# Patient Record
Sex: FEMALE | Race: WHITE
[De-identification: names, ages, dates, MRNs, and addresses within clinical notes are randomized per-mention and may not be internally consistent; named-entity substitution may affect disease eponyms.]

---

## 2017-02-06 ENCOUNTER — HOSPITAL ENCOUNTER (EMERGENCY)
Dept: HOSPITAL 25 - UCEAST | Age: 57
Discharge: HOME | End: 2017-02-06
Payer: MEDICARE

## 2017-02-06 VITALS — SYSTOLIC BLOOD PRESSURE: 133 MMHG | DIASTOLIC BLOOD PRESSURE: 70 MMHG

## 2017-02-06 DIAGNOSIS — Z90.49: ICD-10-CM

## 2017-02-06 DIAGNOSIS — Z88.0: ICD-10-CM

## 2017-02-06 DIAGNOSIS — N39.0: Primary | ICD-10-CM

## 2017-02-06 DIAGNOSIS — Z96.652: ICD-10-CM

## 2017-02-06 PROCEDURE — G0463 HOSPITAL OUTPT CLINIC VISIT: HCPCS

## 2017-02-06 PROCEDURE — 99212 OFFICE O/P EST SF 10 MIN: CPT

## 2017-02-06 PROCEDURE — 87086 URINE CULTURE/COLONY COUNT: CPT

## 2017-02-06 PROCEDURE — 81002 URINALYSIS NONAUTO W/O SCOPE: CPT

## 2017-02-06 NOTE — UC
Complaint Female HPI





- HPI Summary


HPI Summary: 





Patient has had 2 days of dysuria and urgency, no back pain or fever.





- History Of Current Complaint


Chief Complaint: UCGU


Stated Complaint: BURNING URINATION


Time Seen by Provider: 02/06/17 09:45


Hx Obtained From: Patient


Hx Last Menstrual Period: hyst


Pregnant?: No


Onset/Duration: Sudden Onset, Lasting Days


Timing: Constant


Severity Initially: Mild


Severity Currently: Mild


Pain Intensity: 4


Pain Scale Used: 0-10 Numeric


Character: Dull, Burning


Aggravating Factor(s): Urination


Associated Signs And Symptoms: Positive: Negative





- Risk Factors


Ectopic Pregnancy Risk Factor: Negative





- Allergies/Home Medications


Allergies/Adverse Reactions: 


 Allergies











Allergy/AdvReac Type Severity Reaction Status Date / Time


 


Penicillins Allergy Intermediate Rash Verified 09/14/16 13:32


 


dog, cats Allergy  Eyes Uncoded 09/14/16 13:32





   Itchy/Swollen/Red/Watery  


 


environmental Allergy  Eyes Uncoded 09/14/16 13:32





   Itchy/Swollen/Red/Watery  











Home Medications: 


 Home Medications





Aspirin [Aspirin Adult Low Dose]  02/06/17 [History]


Azelastine 0.15% NASAL(NF) [Astepro 0.15% NASAL (NF)]  02/06/17 [History]


Fluticasone Propionate (Nasal) [Allergy Nasal [place] 24 Ho]  02/06/17 [History]











PMH/Surg Hx/FS Hx/Imm Hx


Previously Healthy: Yes


Endocrine History Of: Reports: Thyroid Disease - hypothyroidism


Cardiovascular History Of: Reports: Cardiac Disorders - mitral valve prolapse


Neurological History Of: Reports: Migraine - OCC


Cancer History Of: 


   Denies: Breast Cancer





- Surgical History


Surgical History: Yes


Surgery Procedure, Year, and Place: CERVICAL SURGERY, FUSION X2 1990'S.  LUMBAR 

BACK SURGERY, FUSION X2  1990'S.  Complete Hysterectomy 1992.  L knee 

REPLACEMENT & REVISION 2014  [facility].  LAP CHOLECYSTECTOMY 1990.  RIGHT ROTATOR 

CUFF  2013  [facility].  R CTR WITH ULNAR NERVE DECOMPRESSION 1992.  APPENDECTOMY AS A 

CHILD





- Family History


Known Family History: 


   Negative: Cardiac Disease, Hypertension, Blood Disorder


Family History: denies cardio vascular issues in family lineage





- Social History


Alcohol Use: None


Substance Use Type: None, Prescribed


Smoking Status (MU): Never Smoked Tobacco


Have You Smoked in the Last Year: No





- Immunization History


Most Recent Influenza Vaccination: 2016


Most Recent Pneumonia Vaccination: 2015





Review of Systems


Constitutional: Negative


Skin: Negative


Eyes: Negative


ENT: Negative


Respiratory: Negative


Cardiovascular: Negative


Gastrointestinal: Negative


Genitourinary: Dysuria, Urgency


Motor: Negative


Neurovascular: Negative


Musculoskeletal: Negative


Neurological: Negative


Psychological: Negative


All Other Systems Reviewed And Are Negative: Yes





Physical Exam


Triage Information Reviewed: Yes


Appearance: Well-Appearing, Well-Nourished, Pain Distress


Vital Signs: 


 Initial Vital Signs











Temp  97.3 F   02/06/17 09:34


 


Pulse  63   02/06/17 09:34


 


Resp  16   02/06/17 09:34


 


BP  133/70   02/06/17 09:34


 


Pulse Ox  100   02/06/17 09:34











Vital Signs Reviewed: Yes


Eye Exam: Normal


Eyes: Positive: Conjunctiva Clear


ENT Exam: Normal


ENT: Positive: Normal ENT inspection, Hearing grossly normal, TMs normal


Dental Exam: Normal


Neck exam: Normal


Neck: Positive: Supple, Nontender, No Lymphadenopathy


Respiratory Exam: Normal


Respiratory: Positive: Chest non-tender, Lungs clear, Normal breath sounds


Cardiovascular Exam: Normal


Cardiovascular: Positive: RRR, No Murmur, Pulses Normal


Abdominal Exam: Other - lower abdominal "fullness", neg cva tenderness


Abdomen Description: Positive: No Organomegaly, Soft


Bowel Sounds: Positive: Present


Musculoskeletal Exam: Normal


Musculoskeletal: Positive: Strength Intact, ROM Intact, No Edema


Neurological Exam: Normal


Neurological: Positive: Alert, Muscle Tone Normal


Psychological Exam: Normal


Skin Exam: Normal





 Complaint Female Dx





- Course


Course Of Treatment: hx obtained, exam performed, UA positive for leuks, 

medications given.





- Differential Dx/Diagnosis


Differential Diagnosis/HQI/PQRI: Sexually Transmitted Disease, Ureteral Stone, 

Urinary Tract Infection


Provider Diagnoses: UTI





Discharge





- Discharge Plan


Condition: Stable


Disposition: HOME


Prescriptions: 


Sulfamethox/Trimethoprim DS* [Bactrim /160 TAB*] 1 tab PO BID #6 tab


Patient Education Materials:  Urinary Tract Infection in Women (ED)


Additional Instructions: 


Take the medication as prescribed. increase your fluid intake. follow up with 

any worsening symptoms.

## 2018-01-18 ENCOUNTER — HOSPITAL ENCOUNTER (EMERGENCY)
Dept: HOSPITAL 25 - UCCORT | Age: 58
Discharge: HOME | End: 2018-01-18
Payer: MEDICARE

## 2018-01-18 VITALS — SYSTOLIC BLOOD PRESSURE: 130 MMHG | DIASTOLIC BLOOD PRESSURE: 65 MMHG

## 2018-01-18 DIAGNOSIS — J01.90: Primary | ICD-10-CM

## 2018-01-18 DIAGNOSIS — B96.89: ICD-10-CM

## 2018-01-18 PROCEDURE — 87502 INFLUENZA DNA AMP PROBE: CPT

## 2018-01-18 PROCEDURE — 87651 STREP A DNA AMP PROBE: CPT

## 2018-01-18 PROCEDURE — 99212 OFFICE O/P EST SF 10 MIN: CPT

## 2018-01-18 PROCEDURE — G0463 HOSPITAL OUTPT CLINIC VISIT: HCPCS

## 2018-01-18 NOTE — UC
Ear Complaint HPI





- HPI Summary


HPI Summary: 





56 y/o female presents to the urgent care c/o sore throat, B/L ear pain for the 

past week. Pt reports symptoms started with common cold.  She has been taking 

Alkazeltser cold OTC to alleviate symptoms. Her LF ear pain >RT ear, sore 

throat is 5/10 with swallowing, Mild nasal yellowish discharge. Pt denies fever

, SOB, HA, chest pain, abdominal pain, N/V/D








- History of Current Complaint


Chief Complaint: UCEar


Stated Complaint: SORE THROAT LEFT EARACHE CONGESTION


Time Seen by Provider: 01/18/18 11:43


Hx Obtained From: Patient


Hx Last Menstrual Period: n/a


Onset/Duration: Gradual Onset, Lasting Weeks - 1 week, Still Present, Worse 

Since - yesterday


Severity Initially: Mild


Severity Currently: Moderate


Pain Intensity: 5


Pain Scale Used: 0-10 Numeric


Aggravating Factors: Nothing


Alleviating Factors: OTC Meds


Associated Signs/Symptoms: Positive: URI Symptoms





- Allergies/Home Medications


Allergies/Adverse Reactions: 


 Allergies











Allergy/AdvReac Type Severity Reaction Status Date / Time


 


Penicillins Allergy Intermediate Rash Verified 01/18/18 11:46


 


dog, cats Allergy  Eyes Uncoded 01/18/18 11:46





   Itchy/Swollen/Red/Watery  


 


environmental Allergy  Eyes Uncoded 01/18/18 11:46





   Itchy/Swollen/Red/Watery  











Home Medications: 


 Home Medications





Phenylephrine-Chlorpheniramine [Tiffanie-Drummond Plus Cold & 5-2- mg] 2 tab 

PO BID PRN 01/18/18 [History Confirmed 01/18/18]


Pseudoephedrine HCL ER TAB* [Sudafed 12 Hour*] 120 mg PO BID 01/18/18 [History 

Confirmed 01/18/18]











PMH/Surg Hx/FS Hx/Imm Hx


Previously Healthy: Yes


Endocrine History: Hypothyroidism


Other Endocrine History: Fibromyalgia


Cardiovascular History: Myocardial Infarction





- Surgical History


Surgical History: Yes


Surgery Procedure, Year, and Place: CERVICAL SURGERY, FUSION X2 1990'S.  LUMBAR 

BACK SURGERY, FUSION X2  1990'S.  Complete Hysterectomy 1992.  L knee 

REPLACEMENT & REVISION 2014 [facility].  LAP CHOLECYSTECTOMY 1990.  RIGHT ROTATOR 

CUFF  2013  [facility].  R CTR WITH ULNAR NERVE DECOMPRESSION 1992.  APPENDECTOMY AS A 

CHILD





- Family History


Known Family History: Positive: Cardiac Disease


   Negative: Hypertension, Blood Disorder


Family History: Esophageal cancer





- Social History


Occupation: Employed Full-time


Lives: With Family


Alcohol Use: None


Substance Use Type: None, Prescribed


Smoking Status (MU): Never Smoked Tobacco


Have You Smoked in the Last Year: No





- Immunization History


Most Recent Influenza Vaccination: 2016


Most Recent Pneumonia Vaccination: 2015





Review of Systems


Constitutional: Chills


Skin: Negative


Eyes: Negative


ENT: Sore Throat, Ear Ache - B/L, Nasal Discharge, Sinus Congestion


Respiratory: Negative


Cardiovascular: Negative


Gastrointestinal: Negative


Genitourinary: Negative


Motor: Negative


Neurovascular: Negative


Musculoskeletal: Negative


Neurological: Negative


Psychological: Negative


Is Patient Immunocompromised?: No


All Other Systems Reviewed And Are Negative: Yes





Physical Exam


Triage Information Reviewed: Yes


Vital Signs: 


 Initial Vital Signs











Temp  98.2 F   01/18/18 11:41


 


Pulse  64   01/18/18 11:41


 


Resp  20   01/18/18 11:41


 


BP  130/65   01/18/18 11:41














- Additional Comments


Vital signs: reviewed


General: well developed well nourished female w/o any apparent distress.


Skin: Pink, warm and dry, no evidence of atopic dermatitis, psoriasis, 

seborrhea.


HEENT:


-Head: atraumatic, non tender; no scalp dermatitis.


-Eyes: sclera and conjunctiva clear, PERRLA, EOMI


-Ears: no pre- or postauricular lymphadenopathy or erythema; RT external ear 

canal with erythema and yellowish purulent discharge, pinna tenderness on 

palpation, Rt TM WNL, LF external ear canal clear and LF TM WNL. TMs normal w/

out bulging or retraction. Good light reflex. No fluid level, vesicles, or 

bullae. No perforation.


-Nose/Face: erythematous and edematous nasal mucosa with clear rhinorrhea, no 

frontal or maxillary sinus tender to palpation.


-Mouth/Throat: Mucous membrane moist, posterior pharynx clear, no erythema or 

exudates.


Neck: supple, FROM, nontender, no lymphadenopathy, no meningismus.


Chest: Clear to auscultation, normal breath sounds


Abd: soft, Bowel sounds active, Nontender.


Back: no spinal or CVAT


Neuro: A&O x4, GCS 15, no focal neuro deficits, normal behavior for age.














Ear Complaint Course/Dx





- Course


Course Of Treatment: 56 y/o female presents to the urgent care c/o sore throat, 

B/L ear pain for the past week. Pt reports symptoms started with common cold.  

She has been taking Alkazetler cold OTC to alleviate symptoms. Her LF ear pain >

RT ear, sore throat is 5/10 with swallowing, Mild nasal yellowish discharge. Pt 

denies fever, SOB, HA, chest pain, abdominal pain, N/V/D. Hx obtained. Pt with 

sinusitis on examination.Pt is PCP allergic. Pt with 1 week of symptoms getting 

worse. Pt Rx Doxycycline PO and flonase nasal spray.  Discharge instructions 

explained to Pt. Advised to Return to the clinic or  PCP if symptoms do not 

improve.Pt understood and agreed with plan of care.





- Differential Dx/Diagnosis


Differential Diagnosis/HQI/PQRI: Bronchitis, Otitis Externa, Otitis Media, 

Perforated TM, Pharyngitis, URI, Other - Influenza


Provider Diagnoses: 1- Acute bacterial sinusitis





Discharge





- Discharge Plan


Condition: Stable


Disposition: HOME


Prescriptions: 


DOXYcycline CAP(*) [DOXYcycline 100MG CAP(*)] 100 mg PO BID #20 cap


Fluticasone NASAL SPRAY 50MCG* [Flonase NASAL SPRAY 50MCG*] 2 spray BOTH NARES 

DAILY #1 btl


Patient Education Materials:  Sinusitis (ED)


Referrals: 


Archana Hernandez MD [Primary Care Provider] - 3 Days


Additional Instructions: 


1- Please increase fluid intake and rest. take full course of antibiotic to 

avoid resistance


2-Use Flonase as directed to help drain fluid. Also buy saline drops to clear 

sinuses


3-Take Sudafed or Claritin PO to alleviates sinus congestion


4-Return to the clinic or PCP if symptoms do not improve for further management 

and treatment

## 2018-07-16 ENCOUNTER — HOSPITAL ENCOUNTER (EMERGENCY)
Dept: HOSPITAL 25 - UCEAST | Age: 58
Discharge: HOME | End: 2018-07-16
Payer: COMMERCIAL

## 2018-07-16 VITALS — DIASTOLIC BLOOD PRESSURE: 69 MMHG | SYSTOLIC BLOOD PRESSURE: 127 MMHG

## 2018-07-16 DIAGNOSIS — Z88.0: ICD-10-CM

## 2018-07-16 DIAGNOSIS — N39.0: Primary | ICD-10-CM

## 2018-07-16 DIAGNOSIS — Z91.09: ICD-10-CM

## 2018-07-16 PROCEDURE — G0463 HOSPITAL OUTPT CLINIC VISIT: HCPCS

## 2018-07-16 PROCEDURE — 87086 URINE CULTURE/COLONY COUNT: CPT

## 2018-07-16 PROCEDURE — 99212 OFFICE O/P EST SF 10 MIN: CPT

## 2018-07-16 PROCEDURE — 81003 URINALYSIS AUTO W/O SCOPE: CPT

## 2018-07-16 NOTE — UC
Complaint Female HPI





- HPI Summary


HPI Summary: 


pain and burning with urination began today---no fevers chills nausea vomiting 

or back ache








- History Of Current Complaint


Chief Complaint: UCGU


Stated Complaint: UTI


Time Seen by Provider: 07/16/18 18:23


Hx Obtained From: Patient


Hx Last Menstrual Period: 


Pregnant?: No


Onset/Duration: Sudden Onset, Lasting Days - 1, Still Present


Timing: Constant


Pain Intensity: 6


Pain Scale Used: 0-10 Numeric


Character: Burning


Aggravating Factor(s): Urination


Associated Signs And Symptoms: Negative: Fever, Back Pain, Nausea, Vomiting(# 

Of Episodes =)





- Allergies/Home Medications


Allergies/Adverse Reactions: 


 Allergies











Allergy/AdvReac Type Severity Reaction Status Date / Time


 


Penicillins Allergy  Rash Verified 07/16/18 18:41


 


dog, cats Allergy  Eyes Uncoded 07/16/18 18:41





   Itchy/Swollen/Red/Watery  


 


environmental Allergy  Eyes Uncoded 07/16/18 18:41





   Itchy/Swollen/Red/Watery  














PMH/Surg Hx/FS Hx/Imm Hx


Previously Healthy: No


Endocrine History: Hypothyroidism, Dyslipidemia





- Surgical History


Surgical History: Yes


Surgery Procedure, Year, and Place: CERVICAL SURGERY, FUSION X2 1990'S.  LUMBAR 

BACK SURGERY, FUSION X2  1990'S.  Complete Hysterectomy 1992.  L knee 

REPLACEMENT & REVISION 2014 [facility].  Right wrist surgery 2016.  LAP 

CHOLECYSTECTOMY 1990.  RIGHT ROTATOR CUFF  2013  [facility].  R CTR WITH ULNAR NERVE 

DECOMPRESSION 1992.  APPENDECTOMY AS A CHILD





- Family History


Known Family History: Positive: Cardiac Disease


   Negative: Hypertension, Blood Disorder


Family History: Esophageal cancer





- Social History


Occupation: Employed Part-time


Lives: With Family


Alcohol Use: None


Substance Use Type: None


Smoking Status (MU): Never Smoked Tobacco


Have You Smoked in the Last Year: No





- Immunization History


Most Recent Influenza Vaccination: 2016


Most Recent Pneumonia Vaccination: 2015





Review of Systems


Constitutional: Negative


Skin: Negative


Eyes: Negative


ENT: Negative


Respiratory: Negative


Cardiovascular: Negative


Gastrointestinal: Negative


Genitourinary: Dysuria, Frequency, Urgency


Motor: Negative


Neurovascular: Negative


Musculoskeletal: Negative


Neurological: Negative


Psychological: Negative


Is Patient Immunocompromised?: No


All Other Systems Reviewed And Are Negative: Yes





Physical Exam


Triage Information Reviewed: Yes


Appearance: Well-Appearing, No Pain Distress, Well-Nourished


Vital Signs: 


 Initial Vital Signs











Temp  98.4 F   07/16/18 18:35


 


Pulse  71   07/16/18 18:35


 


Resp  16   07/16/18 18:35


 


BP  127/69   07/16/18 18:35


 


Pulse Ox  100   07/16/18 18:35











Vital Signs Reviewed: Yes


Eye Exam: Normal


Eyes: Positive: Conjunctiva Clear


ENT Exam: Normal


ENT: Positive: Normal ENT inspection, Hearing grossly normal.  Negative: Nasal 

congestion, Trismus, Muffled voice, Hoarse voice


Dental Exam: Normal


Neck exam: Normal


Neck: Positive: Supple, Nontender


Respiratory Exam: Normal


Respiratory: Positive: Chest non-tender, No respiratory distress, No accessory 

muscle use


Cardiovascular Exam: Normal


Cardiovascular: Positive: RRR, Pulses Normal, Brisk Capillary Refill


Abdominal Exam: Normal


Abdomen Description: Positive: Nontender, No Organomegaly, Soft.  Negative: CVA 

Tenderness (R), CVA Tenderness (L), Distended, Guarding


Bowel Sounds: Positive: Present


Musculoskeletal Exam: Normal


Musculoskeletal: Positive: Strength Intact, ROM Intact, No Edema


Neurological Exam: Normal


Neurological: Positive: Alert, Muscle Tone Normal


Psychological Exam: Normal


Skin Exam: Normal





 Complaint Female Dx





- Course


Course Of Treatment: pyridium, bactrim, increase fluids, culture urine follow 

with pcp prn





- Differential Dx/Diagnosis


Provider Diagnoses: UTI





Discharge





- Sign-Out/Discharge


Documenting (check all that apply): Patient Departure





- Discharge Plan


Condition: Stable


Disposition: HOME


Prescriptions: 


Fluconazole [Diflucan 150 MG (NF)] 150 mg PO ONCE #1 tab


Phenazopyridine TAB* [Pyridium 100 mg TAB*] 100 mg PO TID PRN #6 tab


 PRN Reason: urinary pain and burning


Sulfamethox/Trimethoprim DS* [Bactrim /160 TAB*] 1 tab PO BID #10 tab


Patient Education Materials:  Phenazopyridine (By mouth), Urinary Tract 

Infection in Women (ED)


Referrals: 


Archana Hernandez MD [Primary Care Provider] - If Needed





- Billing Disposition and Condition


Condition: STABLE


Disposition: Home

## 2018-08-01 ENCOUNTER — HOSPITAL ENCOUNTER (EMERGENCY)
Dept: HOSPITAL 25 - UCEAST | Age: 58
Discharge: HOME | End: 2018-08-01
Payer: MEDICARE

## 2018-08-01 VITALS — SYSTOLIC BLOOD PRESSURE: 125 MMHG | DIASTOLIC BLOOD PRESSURE: 59 MMHG

## 2018-08-01 DIAGNOSIS — Z88.0: ICD-10-CM

## 2018-08-01 DIAGNOSIS — R30.0: Primary | ICD-10-CM

## 2018-08-01 DIAGNOSIS — Z96.652: ICD-10-CM

## 2018-08-01 DIAGNOSIS — Z90.49: ICD-10-CM

## 2018-08-01 DIAGNOSIS — Z90.710: ICD-10-CM

## 2018-08-01 PROCEDURE — 87798 DETECT AGENT NOS DNA AMP: CPT

## 2018-08-01 PROCEDURE — 99212 OFFICE O/P EST SF 10 MIN: CPT

## 2018-08-01 PROCEDURE — 81003 URINALYSIS AUTO W/O SCOPE: CPT

## 2018-08-01 PROCEDURE — 87086 URINE CULTURE/COLONY COUNT: CPT

## 2018-08-01 PROCEDURE — G0463 HOSPITAL OUTPT CLINIC VISIT: HCPCS

## 2018-08-01 NOTE — UC
Complaint Female HPI





- HPI Summary


HPI Summary: 





the patient is a 57-year-old female with a less than 24-hour history of dysuria 

and urgency.  She has chronic back pain.  She denies any abdominal pain.  She 

has had no fever or chills.  No nausea vomiting or diarrhea.  She denies any 

vaginal discharge or itching.  She has had no history of pyelonephritis or 

kidney stones.





- History Of Current Complaint


Chief Complaint: UCGU


Stated Complaint: URINARY ISSUE


Time Seen by Provider: 08/01/18 11:43


Hx Obtained From: Patient


Hx Last Menstrual Period: 


Onset/Duration: Gradual Onset, Lasting Hours


Timing: Intermittent, Lasting Minutes


Severity Initially: Moderate


Severity Currently: None


Pain Intensity: 6 - no pain at present/6 when urinating


Pain Scale Used: 0-10 Numeric


Character: Burning


Aggravating Factor(s): Urination


Related Hx: Similar Episode/Dx as: - uti





- Allergies/Home Medications


Allergies/Adverse Reactions: 


 Allergies











Allergy/AdvReac Type Severity Reaction Status Date / Time


 


Penicillins Allergy  Rash Verified 07/16/18 18:41


 


dog, cats Allergy  Eyes Uncoded 07/16/18 18:41





   Itchy/Swollen/Red/Watery  


 


environmental Allergy  Eyes Uncoded 07/16/18 18:41





   Itchy/Swollen/Red/Watery  














PMH/Surg Hx/FS Hx/Imm Hx


Previously Healthy: Yes





- Surgical History


Surgical History: Yes


Surgery Procedure, Year, and Place: CERVICAL SURGERY, FUSION X2 1990'S.  LUMBAR 

BACK SURGERY, FUSION X2  1990'S.  Complete Hysterectomy 1992.  L knee 

REPLACEMENT & REVISION 2014  [facility].  Right wrist surgery 2016.  LAP 

CHOLECYSTECTOMY 1990.  RIGHT ROTATOR CUFF  2013  [facility].  R CTR WITH ULNAR NERVE 

DECOMPRESSION 1992.  APPENDECTOMY AS A CHILD





- Family History


Known Family History: Positive: Cardiac Disease


   Negative: Hypertension, Blood Disorder


Family History: Esophageal cancer





- Social History


Alcohol Use: None


Substance Use Type: None


Smoking Status (MU): Never Smoked Tobacco


Have You Smoked in the Last Year: No





- Immunization History


Most Recent Influenza Vaccination: 2016


Most Recent Pneumonia Vaccination: 2015





Review of Systems


Constitutional: Negative


Skin: Negative


Eyes: Negative


ENT: Negative


Respiratory: Negative


Cardiovascular: Negative


Gastrointestinal: Negative


Genitourinary: Dysuria, Frequency, Urgency


Motor: Negative


Neurovascular: Negative


Musculoskeletal: Negative


Neurological: Negative


Psychological: Negative


Is Patient Immunocompromised?: No


All Other Systems Reviewed And Are Negative: Yes





Physical Exam


Triage Information Reviewed: Yes


Appearance: Well-Appearing, No Pain Distress, Well-Nourished


Vital Signs: 


 Initial Vital Signs











Temp  97.7 F   08/01/18 10:59


 


Pulse  74   08/01/18 10:59


 


Resp  17   08/01/18 10:59


 


BP  125/59   08/01/18 10:59


 


Pulse Ox  100   08/01/18 10:59











Vital Signs Reviewed: Yes


Eyes: Positive: Conjunctiva Clear


ENT: Positive: Hearing grossly normal.  Negative: Nasal congestion, Nasal 

drainage, Trismus, Muffled voice, Hoarse voice


Neck: Positive: Supple, Nontender


Respiratory: Positive: Lungs clear, Normal breath sounds, No respiratory 

distress


Cardiovascular: Positive: RRR, No Murmur


Abdomen Description: Positive: Nontender, No Organomegaly, Soft.  Negative: CVA 

Tenderness (R), CVA Tenderness (L)


Musculoskeletal: Positive: ROM Intact, No Edema


Neurological: Positive: Alert


Psychological Exam: Normal





Diagnostics





- Laboratory


Diagnostic Studies Completed/Ordered: UA +1 leuks





 Complaint Female Dx





- Differential Dx/Diagnosis


Provider Diagnoses: dysuria





Discharge





- Sign-Out/Discharge


Documenting (check all that apply): Patient Departure





- Discharge Plan


Condition: Stable


Disposition: HOME


Prescriptions: 


Cephalexin CAP* [Keflex CAP*] 500 mg PO BID #10 cap


Fluconazole 150 MG (NF) [Diflucan 150 mg (NF)] 150 mg PO ONCE #1 tab


Phenazopyridine TAB* [Pyridium TAB*] 100 mg PO TID #6 tab


Patient Education Materials:  Dysuria (ED)


Referrals: 


Archana Hernandez MD [Primary Care Provider] - 5 Days (if not better)





- Billing Disposition and Condition


Condition: STABLE


Disposition: Home

## 2019-04-20 ENCOUNTER — HOSPITAL ENCOUNTER (EMERGENCY)
Dept: HOSPITAL 25 - UCEAST | Age: 59
Discharge: HOME | End: 2019-04-20
Payer: MEDICARE

## 2019-04-20 VITALS — SYSTOLIC BLOOD PRESSURE: 131 MMHG | DIASTOLIC BLOOD PRESSURE: 58 MMHG

## 2019-04-20 DIAGNOSIS — M26.621: Primary | ICD-10-CM

## 2019-04-20 DIAGNOSIS — Z88.0: ICD-10-CM

## 2019-04-20 DIAGNOSIS — Z91.048: ICD-10-CM

## 2019-04-20 DIAGNOSIS — Z96.652: ICD-10-CM

## 2019-04-20 DIAGNOSIS — H93.11: ICD-10-CM

## 2019-04-20 PROCEDURE — 99212 OFFICE O/P EST SF 10 MIN: CPT

## 2019-04-20 PROCEDURE — 70486 CT MAXILLOFACIAL W/O DYE: CPT

## 2019-04-20 PROCEDURE — G0463 HOSPITAL OUTPT CLINIC VISIT: HCPCS

## 2019-04-20 NOTE — UC
General HPI





- HPI Summary


HPI Summary: 





59 yo female with last several days of R jaw pain.  Denies known inciting 

event.  Hurts to open mouth fully.  Seen yesterday by dentist, dental xrays ok.

  Approx 2 weeks ago had cervical neck injections d/t djd (s/p cervical spine 

surgery several years ago).  No fever / chills.  Some ringing in Right ear.  

Does not think her hearing has decreased substantially but difficult to tell d/

t pain.  Some pain in ant lat neck.  no mouth sores.  Strong hx of spinal issues

, including djd, but never hx R jaw pain.  Does not gring teeth at night.  Has 

used heat, which may have helped a little but not sure.  Has taken hydrocodone, 

did not help.  Nsaid x 2, but not sure if helped.  No GI issues.  No cardiopulm 

issues.  No visual issues.  No h/a except as above.  No rash.  No pain in 

sinuses. 





- History of Current Complaint


Chief Complaint: UCUpperExtremity


Stated Complaint: JAW PAIN


Time Seen by Provider: 04/20/19 08:32


Hx Obtained From: Patient


Hx Last Menstrual Period: 


Pain Intensity: 6





- Allergy/Home Medications


Allergies/Adverse Reactions: 


 Allergies











Allergy/AdvReac Type Severity Reaction Status Date / Time


 


Penicillins Allergy  Rash Verified 04/20/19 08:21


 


dog, cats Allergy  Eyes Uncoded 04/20/19 08:21





   Itchy/Swollen/Red/Watery  


 


environmental Allergy  Eyes Uncoded 04/20/19 08:21





   Itchy/Swollen/Red/Watery  














PMH/Surg Hx/FS Hx/Imm Hx





- Additional Past Medical History


Additional PMH: 





see hpi


Previously Healthy: Yes





- Surgical History


Surgical History: Yes


Surgery Procedure, Year, and Place: CERVICAL SURGERY, FUSION X2 1990'S.  LUMBAR 

BACK SURGERY, FUSION X2  1990'S.  Complete Hysterectomy 1992.  L knee 

REPLACEMENT & REVISION 2014  [facility].  Right wrist surgery 2016.  LAP 

CHOLECYSTECTOMY 1990.  RIGHT ROTATOR CUFF  2013  [facility].  R CTR WITH ULNAR NERVE 

DECOMPRESSION 1992.  APPENDECTOMY AS A CHILD





- Family History


Known Family History: Positive: Cardiac Disease


   Negative: Hypertension, Blood Disorder


Family History: Esophageal cancer





- Social History


Alcohol Use: None


Substance Use Type: None


Smoking Status (MU): Never Smoked Tobacco


Have You Smoked in the Last Year: No





- Immunization History


Most Recent Influenza Vaccination: 2016


Most Recent Pneumonia Vaccination: 2015





Review of Systems


All Other Systems Reviewed And Are Negative: Yes


Constitutional: Positive: Other - see hpi


Skin: Positive: Other - see hpi


Eyes: Positive: Other - see hpi


ENT: Positive: Other - see hpi


Respiratory: Positive: Other - see hpi


Cardiovascular: Positive: Other - see hpi


Gastrointestinal: Positive: Other - see hpi


Genitourinary: Positive: Other - see hpi


Motor: Positive: Other - see hpi


Neurovascular: Positive: Other - see hpi


Musculoskeletal: Positive: Other: - see hpi


Neurological: Positive: Other - see hpi


Psychological: Positive: Negative, Other


Is Patient Immunocompromised?: No





Physical Exam


Triage Information Reviewed: Yes


Appearance: Well-Appearing, Well-Nourished


Vital Signs: 


 Initial Vital Signs











Temp  97.3 F   04/20/19 08:16


 


Pulse  69   04/20/19 08:16


 


Resp  18   04/20/19 08:16


 


BP  123/68   04/20/19 08:16


 


Pulse Ox  99   04/20/19 08:16











Vital Signs Reviewed: Yes


Eye Exam: Normal


ENT Exam: Other - No josh sinus tend


ENT: Positive: Pharynx normal, TM dull, Other - post pharynx nad.   Tender R tmj

, but nontender teeth / gums.  No sores / exudates.  trachea midline.   EAC ok.

   No cerumen.  Tender R tmj, mild trismus.


Dental Exam: Other - see below


Neck exam: Other - djd, cervical sx well healed scar


Respiratory Exam: Normal


Respiratory: Positive: Chest non-tender, Lungs clear, Normal breath sounds, No 

respiratory distress, No accessory muscle use


Cardiovascular Exam: Normal


Cardiovascular: Positive: RRR, No Murmur, Pulses Normal, Brisk Capillary Refill


Abdominal Exam: Normal


Abdomen Description: Positive: Nontender


Musculoskeletal Exam: Normal - gait steady


Neurological Exam: Normal - grossly nonfocal


Psychological Exam: Normal - conversing easily and appropriately


Skin Exam: Normal - no visible or reported rash





Course/Dx





- Course


Course Of Treatment: 





CT facial bones n/c reviewed with pt. 





D/w pt coa / tx plan.  Need to go to ED for worse or new problems.  





S/sx c/w tmj, however, tinnitus is concerning.  D/w pt.  Will f/u pcp early 

this week.  May need audiogram.  


No recent abx. 





Will start prednisone. 


Also muscle relax. 





Questions as posed answered to the best of my ability. 





- Diagnoses


Provider Diagnosis: 


 Tinnitus, Temporomandibular joint (TMJ) pain








Discharge





- Sign-Out/Discharge


Documenting (check all that apply): Patient Departure


All imaging exams completed and their final reports reviewed: Yes





- Discharge Plan


Condition: Good


Disposition: HOME


Prescriptions: 


Cyclobenzaprine TAB* [Flexeril 10 MG TAB*] 10 mg PO TID PRN #30 tab


 PRN Reason: Spasms


predniSONE TAB* [Deltasone 10 MG TAB*] 10 mg PO DAILY #20 tab


Patient Education Materials:  Temporomandibular Disorder (ED), Tinnitus (ED)


Referrals: 


Archana Hernandez MD [Primary Care Provider] - 


Additional Instructions: 


Very important that you follow up with your primary care physician, try to 

schedule appointment for early this week (Monday if possible). 





I recommend that you have an audiogram, to assess your hearing status.  This 

would need to be ordered by your primary care physician.  





Please go to the Emergency Department for any worse or new problems. 








- Billing Disposition and Condition


Condition: GOOD


Disposition: Home

## 2019-04-20 NOTE — UC
UC General HPI





- History of Current Complaint


Chief Complaint: UCUpperExtremity


Stated Complaint: JAW PAIN


Time Seen by Provider: 04/20/19 08:32


Hx Last Menstrual Period: 


Pain Intensity: 6





- Allergy/Home Medications


Allergies/Adverse Reactions: 


 Allergies











Allergy/AdvReac Type Severity Reaction Status Date / Time


 


Penicillins Allergy  Rash Verified 04/20/19 08:21


 


dog, cats Allergy  Eyes Uncoded 04/20/19 08:21





   Itchy/Swollen/Red/Watery  


 


environmental Allergy  Eyes Uncoded 04/20/19 08:21





   Itchy/Swollen/Red/Watery  














PMH/Surg Hx/FS Hx/Imm Hx





- Surgical History


Surgical History: Yes


Surgery Procedure, Year, and Place: CERVICAL SURGERY, FUSION X2 1990'S.  LUMBAR 

BACK SURGERY, FUSION X2  1990'S.  Complete Hysterectomy 1992.  L knee 

REPLACEMENT & REVISION 2014 [facility].  Right wrist surgery 2016.  LAP 

CHOLECYSTECTOMY 1990.  RIGHT ROTATOR CUFF  2013  [facility].  R CTR WITH ULNAR NERVE 

DECOMPRESSION 1992.  APPENDECTOMY AS A CHILD





- Family History


Known Family History: Positive: Cardiac Disease


   Negative: Hypertension, Blood Disorder


Family History: Esophageal cancer





- Social History


Alcohol Use: None


Substance Use Type: None


Smoking Status (MU): Never Smoked Tobacco


Have You Smoked in the Last Year: No





- Immunization History


Most Recent Influenza Vaccination: 2016


Most Recent Pneumonia Vaccination: 2015





Physical Exam


Vital Signs: 


 Initial Vital Signs











Temp  97.3 F   04/20/19 08:16


 


Pulse  69   04/20/19 08:16


 


Resp  18   04/20/19 08:16


 


BP  123/68   04/20/19 08:16


 


Pulse Ox  99   04/20/19 08:16














Discharge





- Discharge Plan


Referrals: 


Archana Hernandez MD [Primary Care Provider] -

## 2019-04-29 ENCOUNTER — HOSPITAL ENCOUNTER (EMERGENCY)
Dept: HOSPITAL 25 - UCEAST | Age: 59
Discharge: HOME | End: 2019-04-29
Payer: MEDICARE

## 2019-04-29 VITALS — DIASTOLIC BLOOD PRESSURE: 60 MMHG | SYSTOLIC BLOOD PRESSURE: 124 MMHG

## 2019-04-29 DIAGNOSIS — Z96.652: ICD-10-CM

## 2019-04-29 DIAGNOSIS — J32.9: Primary | ICD-10-CM

## 2019-04-29 DIAGNOSIS — Z91.048: ICD-10-CM

## 2019-04-29 DIAGNOSIS — Z88.0: ICD-10-CM

## 2019-04-29 PROCEDURE — G0463 HOSPITAL OUTPT CLINIC VISIT: HCPCS

## 2019-04-29 PROCEDURE — 99212 OFFICE O/P EST SF 10 MIN: CPT

## 2019-04-29 NOTE — UC
Throat Pain/Nasal Arthur HPI





- HPI Summary


HPI Summary: 





58 year old female recently seen and dxd with TMJ/ muscle spasms presents with 

sinus pressure, congestion, post-nasal drip x 5 days.   + fever, + chills, 

minimal cough, non-=productive.  + fatigue.  TMJ symptoms resolved.   





- History of Current Complaint


Stated Complaint: SINUS ISSUES


Time Seen by Provider: 04/29/19 08:29


Hx Obtained From: Patient


Hx Last Menstrual Period: 


Pregnant?: No


Onset/Duration: Sudden Onset, Lasting Days, Worse Since


Severity: Moderate


Cough: Nonproductive


Associated Signs & Symptoms: Positive: Sinus Discomfort, Nasal Discharge





- Allergies/Home Medications


Allergies/Adverse Reactions: 


 Allergies











Allergy/AdvReac Type Severity Reaction Status Date / Time


 


Penicillins Allergy  Rash Verified 04/29/19 09:01


 


dog, cats Allergy  Eyes Uncoded 04/29/19 09:01





   Itchy/Swollen/Red/Watery  


 


environmental Allergy  Eyes Uncoded 04/29/19 09:01





   Itchy/Swollen/Red/Watery  














PMH/Surg Hx/FS Hx/Imm Hx


Previously Healthy: Yes





- Surgical History


Surgical History: Yes


Surgery Procedure, Year, and Place: CERVICAL SURGERY, FUSION X2 1990'S.  LUMBAR 

BACK SURGERY, FUSION X2  1990'S.  Complete Hysterectomy 1992.  L knee 

REPLACEMENT & REVISION 2014  [facility].  Right wrist surgery 2016.  LAP 

CHOLECYSTECTOMY 1990.  RIGHT ROTATOR CUFF  2013  [facility].  R CTR WITH ULNAR NERVE 

DECOMPRESSION 1992.  APPENDECTOMY AS A CHILD





- Family History


Known Family History: Positive: Cardiac Disease


   Negative: Hypertension, Blood Disorder


Family History: Esophageal cancer





- Social History


Alcohol Use: None


Substance Use Type: None


Smoking Status (MU): Never Smoked Tobacco


Have You Smoked in the Last Year: No





- Immunization History


Most Recent Influenza Vaccination: 2016


Most Recent Pneumonia Vaccination: 2015





Review of Systems


All Other Systems Reviewed And Are Negative: Yes


Constitutional: Positive: Fever, Chills, Fatigue


ENT: Positive: Sore Throat, Nasal Discharge, Sinus Congestion, Sinus Pain/

Tenderness


Respiratory: Positive: Cough


Neurological: Positive: Headache


Is Patient Immunocompromised?: No





Physical Exam


Triage Information Reviewed: No


Appearance: No Pain Distress, Well-Nourished, Ill-Appearing - mild


Vital Signs Reviewed: Yes


Eyes: Positive: Conjunctiva Clear


ENT: Positive: Pharyngeal erythema - mild, Nasal congestion, Nasal drainage, 

TMs normal, Sinus tenderness, Uvula midline.  Negative: Tonsillar swelling, 

Tonsillar exudate


Neck: Positive: Supple, Nontender, No Lymphadenopathy.  Negative: Nuchal 

Rigidity, Enlarged Nodes @


Respiratory: Positive: Chest non-tender, Lungs clear, Normal breath sounds, No 

respiratory distress, No accessory muscle use.  Negative: Crackles, Rhonchi, 

Stridor, Expiration, Inspiration


Cardiovascular: Positive: RRR


Psychological Exam: Normal


Skin Exam: Normal





Throat Pain/Nasal Course/Dx





- Course


Course Of Treatment: 





sinusitis, abx given, follow up with pcp if no improvement within 2-3 days 





- Differential Dx/Diagnosis


Differential Diagnosis/HQI/PQRI: Influenza, Pharyngitis, Sinusitis


Provider Diagnosis: 


 Sinusitis








Discharge





- Sign-Out/Discharge


Documenting (check all that apply): Patient Departure


All imaging exams completed and their final reports reviewed: No Studies





- Discharge Plan


Condition: Good


Disposition: HOME


Prescriptions: 


DOXYcycline CAP(*) [DOXYcycline 100MG CAP(*)] 100 mg PO BID #14 cap


Fluconazole 150 MG TAB* [Diflucan 150 MG TAB*] 150 mg PO ONCE #1 tablet


Patient Education Materials:  Sinusitis (ED)


Referrals: 


Archana Hernandez MD [Primary Care Provider] - 


Additional Instructions: 


- Over the counter medications for symptoms 


- Tylenol/ motrin for fever, pain 


- Go to ER with worsening pain, fever > 102., neck pain 


- Antibiotics as directed 


- Increase fluid intake 








- Billing Disposition and Condition


Condition: GOOD


Disposition: Home

## 2020-01-03 ENCOUNTER — HOSPITAL ENCOUNTER (EMERGENCY)
Dept: HOSPITAL 25 - UCCORT | Age: 60
Discharge: HOME | End: 2020-01-03
Payer: MEDICARE

## 2020-01-03 VITALS — SYSTOLIC BLOOD PRESSURE: 118 MMHG | DIASTOLIC BLOOD PRESSURE: 60 MMHG

## 2020-01-03 DIAGNOSIS — J11.1: Primary | ICD-10-CM

## 2020-01-03 DIAGNOSIS — Z88.0: ICD-10-CM

## 2020-01-03 DIAGNOSIS — Z91.09: ICD-10-CM

## 2020-01-03 DIAGNOSIS — J32.9: ICD-10-CM

## 2020-01-03 LAB — FLUBV RNA SPEC QL NAA+PROBE: POSITIVE

## 2020-01-03 PROCEDURE — 99212 OFFICE O/P EST SF 10 MIN: CPT

## 2020-01-03 PROCEDURE — 87651 STREP A DNA AMP PROBE: CPT

## 2020-01-03 PROCEDURE — G0463 HOSPITAL OUTPT CLINIC VISIT: HCPCS

## 2020-01-03 NOTE — UC
Throat Pain/Nasal Arthur HPI





- HPI Summary


HPI Summary: 


Patient is a 60yo female presenting with sore throat and nasal congestion x10 

days. States has moved to chest and now has mild productive cough mostly at 

night. Denies sob, wheezing, difficulty breathing. Notes sinus pressure and 

headaches. Denies ear pain. Notes subjective fevers body aches and chills 

intermittently since yesterday. Denies n/v. States has tried everything otc 

without relief.








- History of Current Complaint


Chief Complaint: UCGeneralIllness


Stated Complaint: SORE THROAT COUGH TIRED ACHY


Hx Obtained From: Patient


Hx Last Menstrual Period: 


Onset/Duration: Gradual Onset


Pain Intensity: 6


Pain Scale Used: 0-10 Numeric





- Allergies/Home Medications


Allergies/Adverse Reactions: 


 Allergies











Allergy/AdvReac Type Severity Reaction Status Date / Time


 


Penicillins Allergy  Rash Verified 01/03/20 11:10


 


dog, cats Allergy  Eyes Uncoded 01/03/20 11:10





   Itchy/Swollen/Red/Watery  


 


environmental Allergy  Eyes Uncoded 01/03/20 11:10





   Itchy/Swollen/Red/Watery  











Home Medications: 


 Home Medications





Azelastine 0.1% Nasal (NF) [Astepro 0.1% Nasal (NF)] 1 spray BOTH NARES DAILY 01 /03/20 [History Confirmed 01/03/20]


Dm/PE/Acetaminophen/Chlorphenr [Tiffanie-Cartwright Plus Cold &] 1 cap PO ONCE 01/03/ 20 [History Confirmed 01/03/20]


Ibuprofen TAB* [Motrin TAB* 400 MG] 200 mg PO Q6H PRN 01/03/20 [History 

Confirmed 01/03/20]


Phenylephrine/Dm/Acetaminop/GG [Cold & Flu Severe Daytime 5--325 mg] 1 

tab PO ONCE 01/03/20 [History Confirmed 01/03/20]











PMH/Surg Hx/FS Hx/Imm Hx


Endocrine History: Hypothyroidism, Dyslipidemia





- Surgical History


Surgical History: Yes


Surgery Procedure, Year, and Place: CERVICAL SURGERY, FUSION X2 1990'S.  LUMBAR 

BACK SURGERY, FUSION X2  1990'S.  Complete Hysterectomy 1992.  L knee 

REPLACEMENT & REVISION 2014  [facility].  Right wrist surgery 2016.  LAP 

CHOLECYSTECTOMY 1990.  RIGHT ROTATOR CUFF  2013  [facility].  R CTR WITH ULNAR NERVE 

DECOMPRESSION 1992.  APPENDECTOMY AS A CHILD





- Family History


Known Family History: Positive: Cardiac Disease


   Negative: Hypertension, Blood Disorder


Family History: Esophageal cancer





- Social History


Alcohol Use: None


Substance Use Type: None


Smoking Status (MU): Never Smoked Tobacco


Have You Smoked in the Last Year: No





- Immunization History


Most Recent Influenza Vaccination: 2016


Most Recent Pneumonia Vaccination: 2015





Review of Systems


All Other Systems Reviewed And Are Negative: Yes


Constitutional: Positive: Fever - subjective, Chills, Fatigue


ENT: Positive: Sore Throat, Sinus Congestion, Sinus Pain/Tenderness - pressure.

  Negative: Ear Ache


Respiratory: Positive: Cough - mild productive.  Negative: Shortness Of Breath


Cardiovascular: Positive: Negative


Gastrointestinal: Positive: Negative.  Negative: Vomiting, Nausea


Musculoskeletal: Positive: Myalgia


Neurological: Positive: Headache





Physical Exam


Triage Information Reviewed: Yes


Appearance: No Pain Distress, Well-Nourished, Ill-Appearing


Vital Signs: 


 Initial Vital Signs











Temp  97.7 F   01/03/20 11:09


 


Pulse  69   01/03/20 11:09


 


Resp  18   01/03/20 11:09


 


BP  118/60   01/03/20 11:09


 


Pulse Ox  100   01/03/20 11:09








 Lab Results











  01/03/20 01/03/20 Range/Units





  11:34 12:00 


 


Influenza B (Rapid)   Positive A  (Negative)  


 


Group A Strep Rapid  Negative   (Negative)  











Vital Signs Reviewed: Yes


Eyes: Positive: Conjunctiva Clear


ENT: Positive: Hearing grossly normal, Pharyngeal erythema, Nasal congestion, 

Nasal drainage - PND, TMs normal, Hoarse voice, Uvula midline.  Negative: 

Tonsillar swelling, Tonsillar exudate, Trismus, Muffled voice


Neck exam: Normal


Neck: Positive: Supple, Nontender, No Lymphadenopathy


Respiratory Exam: Normal


Respiratory: Positive: Lungs clear, Normal breath sounds, No respiratory 

distress, No accessory muscle use.  Negative: Crackles, Rhonchi, Stridor, 

Wheezing


Cardiovascular Exam: Normal


Cardiovascular: Positive: RRR, No Murmur.  Negative: Tachycardia





Throat Pain/Nasal Course/Dx





- Course


Course Of Treatment: 


Rapid flu positive. Negative strep. I treated patient with tamiflu and 

augmentin for flu and sinusitis. Patient states allergy only to plain 

penicillin.  States has taken Augmentin in the past without issues. Educated on 

influenza and symptomatic treatment. Instructed to follow up with pcp if 

symptoms worsen or persist. Patient voiced understanding and agreed with 

treatment plan.





- Differential Dx/Diagnosis


Provider Diagnosis: 


 Rhinosinusitis, Influenza








Discharge ED





- Sign-Out/Discharge


Documenting (check all that apply): Patient Departure


All imaging exams completed and their final reports reviewed: No Studies





- Discharge Plan


Condition: Stable


Disposition: HOME


Prescriptions: 


Amoxicillin/Clavulanate TAB* [Augmentin *] 875 mg PO BID #14 tab


Oseltamivir CAP* [Tamiflu CAP*] 75 mg PO BID #10 cap


Patient Education Materials:  Influenza (ED), Rhinosinusitis (ED)


Referrals: 


Archana Hernandez MD [Primary Care Provider] - If Needed


Additional Instructions: 


As discussed, take Augmentin for treatment of your sinusitis. You also tested 

positive for the flu today. Take Tamiflu as prescribed.


You may also use Flonase or nasal saline spray for symptomatic relief.


You may take over the counter pain medications as directed for pain relief.


Get plenty of rest and increase your fluid intake.


Follow up with your primary care provider if symptoms worsen or do not resolve.














- Billing Disposition and Condition


Condition: STABLE


Disposition: Home